# Patient Record
Sex: MALE | Race: WHITE | URBAN - METROPOLITAN AREA
[De-identification: names, ages, dates, MRNs, and addresses within clinical notes are randomized per-mention and may not be internally consistent; named-entity substitution may affect disease eponyms.]

---

## 2018-05-12 ENCOUNTER — OFFICE VISIT (OUTPATIENT)
Dept: INTERNAL MEDICINE CLINIC | Age: 63
End: 2018-05-12

## 2018-05-12 VITALS
RESPIRATION RATE: 18 BRPM | TEMPERATURE: 96.3 F | WEIGHT: 201 LBS | SYSTOLIC BLOOD PRESSURE: 135 MMHG | HEIGHT: 70 IN | DIASTOLIC BLOOD PRESSURE: 89 MMHG | OXYGEN SATURATION: 99 % | BODY MASS INDEX: 28.77 KG/M2 | HEART RATE: 59 BPM

## 2018-05-12 DIAGNOSIS — R68.83 CHILLS: ICD-10-CM

## 2018-05-12 DIAGNOSIS — Z76.89 ENCOUNTER FOR INCISION AND DRAINAGE PROCEDURE: ICD-10-CM

## 2018-05-12 DIAGNOSIS — L02.214 ABSCESS OF GROIN, LEFT: Primary | ICD-10-CM

## 2018-05-12 RX ORDER — SULFAMETHOXAZOLE AND TRIMETHOPRIM 800; 160 MG/1; MG/1
1 TABLET ORAL 2 TIMES DAILY
Qty: 14 TAB | Refills: 0 | Status: SHIPPED | OUTPATIENT
Start: 2018-05-12 | End: 2018-05-19

## 2018-05-12 RX ORDER — LIDOCAINE HYDROCHLORIDE 10 MG/ML
2 INJECTION, SOLUTION EPIDURAL; INFILTRATION; INTRACAUDAL; PERINEURAL ONCE
Qty: 1 VIAL | Refills: 0
Start: 2018-05-12 | End: 2018-05-12

## 2018-05-12 RX ORDER — EPINEPHRINE 1 MG/ML
1 INJECTION INTRAMUSCULAR; INTRAVENOUS; SUBCUTANEOUS ONCE
Qty: 1 VIAL | Refills: 0
Start: 2018-05-12 | End: 2018-05-12

## 2018-05-12 NOTE — MR AVS SNAPSHOT
[address] [facility], [place], operated by [facility] 
 
 
 [address] [facility] [address] [address] 
[phone] Patient: Kendra Jaramillo MRN: [id number]:[id number] Visit Information Date & Time Provider Department Dept. Phone Encounter #  
 5/12/2018  1:30 PM Ruthann Roth NP [address] [phone] [id number] Follow-up Instructions Return in about 3 days (around 5/15/2018), or if symptoms worsen or fail to improve, for F/up Abscess. Upcoming Health Maintenance Date Due DTaP/Tdap/Td series (1 - Tdap) 3/17/1977 FOBT Q 1 YEAR AGE 50-75 3/17/2006 ZOSTER VACCINE AGE 60> 1/17/2016 Influenza Age 5 to Adult 8/1/2018 Allergies as of 5/12/2018  Review Complete On: 5/12/2018 By: Ruthann Roth NP No Known Allergies Current Immunizations  Never Reviewed No immunizations on file. Not reviewed this visit You Were Diagnosed With   
  
 Codes Comments Abscess of groin, left    -  Primary ICD-10-CM: L02.214 ICD-9-CM: 682.2 Encounter for incision and drainage procedure     ICD-10-CM: Z01.89 ICD-9-CM: V72.85 Vitals BP Pulse Temp Resp Height(growth percentile) Weight(growth percentile) 135/89 (BP 1 Location: Right arm, BP Patient Position: Sitting) (!) 59 96.3 °F (35.7 °C) (Oral) 18 5' 10\" (1.778 m) 201 lb (91.2 kg) SpO2 BMI Smoking Status 99% 28.84 kg/m2 Never Smoker Vitals History BMI and BSA Data Body Mass Index Body Surface Area  
 28.84 kg/m 2 2.12 m 2 Preferred Pharmacy Pharmacy Name Phone [facility] [address] [facility], [address] [place] [address] [phone] Your Updated Medication List  
  
   
This list is accurate as of 5/12/18  2:45 PM.  Always use your most recent med list.  
  
  
  
  
 EPINEPHrine 1 mg/mL injection Commonly known as:  ADRENALIN  
1 mL by SubCUTAneous route once for 1 dose. lidocaine (PF) 10 mg/mL (1 %) injection Commonly known as:  XYLOCAINE  
2 mL by SubCUTAneous route once for 1 dose. Indications: ADMINISTRATION OF LOCAL ANESTHESIA  
  
 trimethoprim-sulfamethoxazole 160-800 mg per tablet Commonly known as:  BACTRIM DS, SEPTRA DS Take 1 Tab by mouth two (2) times a day for 7 days. Prescriptions Sent to Pharmacy Refills  
 trimethoprim-sulfamethoxazole (BACTRIM DS, SEPTRA DS) 160-800 mg per tablet 0 Sig: Take 1 Tab by mouth two (2) times a day for 7 days. Class: Normal  
 Pharmacy: [facility] [address] [place] [address], [address] [place] [address] Ph #: [phone] Route: Oral  
  
We Performed the Following DRAIN SKIN ABSCESS SIMPLE [29147 CPT(R)] VA THER/PROPH/DIAG INJECTION, SUBCUT/IM [id number] CPT(R)] Follow-up Instructions Return in about 3 days (around 5/15/2018), or if symptoms worsen or fail to improve, for F/up Abscess. To-Do List   
 05/12/2018 Microbiology:  CULTURE, WOUND W GRAM STAIN Patient Instructions Skin Abscess: Care Instructions Your Care Instructions A skin abscess is a bacterial infection that forms a pocket of pus. A boil is a kind of skin abscess. The doctor may have cut an opening in the abscess so that the pus can drain out. You may have gauze in the cut so that the abscess will stay open and keep draining. You may need antibiotics. You will need to follow up with your doctor to make sure the infection has gone away. The doctor has checked you carefully, but problems can develop later. If you notice any problems or new symptoms, get medical treatment right away. Follow-up care is a key part of your treatment and safety. Be sure to make and go to all appointments, and call your doctor if you are having problems. It's also a good idea to know your test results and keep a list of the medicines you take. How can you care for yourself at home? · Apply warm and dry compresses, a heating pad set on low, or a hot water bottle 3 or 4 times a day for pain. Keep a cloth between the heat source and your skin. · If your doctor prescribed antibiotics, take them as directed. Do not stop taking them just because you feel better. You need to take the full course of antibiotics. · Take pain medicines exactly as directed. ¨ If the doctor gave you a prescription medicine for pain, take it as prescribed. ¨ If you are not taking a prescription pain medicine, ask your doctor if you can take an over-the-counter medicine. · Wash with normal saline spray solution with dressing changes. Keep your bandage clean and dry. Change the bandage three times a day for the first three days, or whenever it gets wet or dirty. · If the abscess was packed with gauze: 
¨ Keep follow-up appointments to have the gauze changed or removed. If the doctor instructed you to remove the gauze, gently pull out all of the gauze when your doctor tells you to. ¨ After the gauze is removed, soak the area in warm water for 15 to 20 minutes 2 times a day, until the wound closes. When should you call for help? Call your doctor now or seek immediate medical care if: 
? · You have signs of worsening infection, such as: 
¨ Increased pain, swelling, warmth, or redness. ¨ Red streaks leading from the infected skin. ¨ Pus draining from the wound. ¨ A fever. ? Watch closely for changes in your health, and be sure to contact your doctor if: 
? · You do not get better as expected. Where can you learn more? Go to http://neo-dimas.info/. Enter G488 in the search box to learn more about \"Skin Abscess: Care Instructions. \" Current as of: October 13, 2016 Content Version: 11.4 © 9630-5468 Thing[employer].  Care instructions adapted under license by [employer] (which disclaims liability or warranty for this information). If you have questions about a medical condition or this instruction, always ask your healthcare professional. [address] any warranty or liability for your use of this information. Introducing [address]! [facility] introduces Skigit patient portal. Now you can access parts of your medical record, email your doctor's office, and request medication refills online. 1. In your internet browser, go to [email or web address] 2. Click on the First Time User? Click Here link in the Sign In box. You will see the New Member Sign Up page. 3. Enter your Skigit Access Code exactly as it appears below. You will not need to use this code after youve completed the sign-up process. If you do not sign up before the expiration date, you must request a new code. · Skigit Access Code: [email or web address] Expires: 8/10/2018  1:27 PM 
 
4. Enter the last four digits of your Social Security Number (xxxx) and Date of Birth (mm/dd/yyyy) as indicated and click Submit. You will be taken to the next sign-up page. 5. Create a Skigit ID. This will be your Skigit login ID and cannot be changed, so think of one that is secure and easy to remember. 6. Create a Skigit password. You can change your password at any time. 7. Enter your Password Reset Question and Answer. This can be used at a later time if you forget your password. 8. Enter your e-mail address. You will receive e-mail notification when new information is available in [address] 9. Click Sign Up. You can now view and download portions of your medical record. 10. Click the Download Summary menu link to download a portable copy of your medical information. If you have questions, please visit the Frequently Asked Questions section of the Skigit website. Remember, Skigit is NOT to be used for urgent needs. For medical emergencies, dial 911. Now available from your iPhone and Android! Please provide this summary of care documentation to your next provider. If you have any questions after today's visit, please call [phone].

## 2018-05-12 NOTE — PROGRESS NOTES
Pastor Bullock presents today for   Chief Complaint   Patient presents with    Skin Problem     poss abscess on abd. no confirmed fever       Pastor Bullock preferred language for health care discussion is english/other. Is someone accompanying this pt? Yes, wife    Is the patient using any DME equipment during [address]? no    Depression Screening:  PHQ over the last two weeks 5/12/2018   Little interest or pleasure in doing things Not at all   Feeling down, depressed or hopeless Not at all   Total Score PHQ 2 0       Learning Assessment:  Learning Assessment 5/12/2018   PRIMARY LEARNER Patient   HIGHEST LEVEL OF EDUCATION - PRIMARY LEARNER  > 4 YEARS OF COLLEGE   BARRIERS PRIMARY LEARNER NONE   CO-LEARNER CAREGIVER No   PRIMARY LANGUAGE ENGLISH   LEARNER PREFERENCE PRIMARY READING     LISTENING   ANSWERED BY patient   RELATIONSHIP SELF       Abuse Screening:  Abuse Screening Questionnaire 5/12/2018   Do you ever feel afraid of your partner? N   Are you in a relationship with someone who physically or mentally threatens you? N   Is it safe for you to go home? Y       Fall Risk  No flowsheet data found. Health Maintenance reviewed and discussed per provider. Yes    Pastor Bullock is due for   Health Maintenance Due   Topic Date Due    DTaP/Tdap/Td series (1 - Tdap) 03/17/1977    FOBT Q 1 YEAR AGE 50-75  03/17/2006    ZOSTER VACCINE AGE 60>  01/17/2016     Please order/place referral if appropriate. Advance Directive:  1. Do you have an advance directive in place? Patient Reply: no    2. If not, would you like material regarding how to put one in place? Patient Reply: no    Coordination of Care:  1. Have you been to the ER, urgent care clinic since your last visit? Hospitalized since your last visit? no    2. Have you seen or consulted any other health care providers outside of the [address] [place] [address] since your last visit? Include any pap smears or colon screening.  no

## 2018-05-12 NOTE — PROGRESS NOTES
HISTORY OF PRESENT ILLNESS  Mark Gill is a 59 y.o. male.   HPI Comments: Something smaller several months, Monday swelling, more pain, red, warm,   No drainage, starting to change at top, feeling warm    Hydrocortisone cream for itching and redness relief, triple antibiotic cream.    advil for pain     Skin Problem         ROS    Physical Exam    ASSESSMENT and PLAN  {ASSESSMENT/PLAN:92187}

## 2018-05-12 NOTE — PATIENT INSTRUCTIONS
Skin Abscess: Care Instructions  Your Care Instructions    A skin abscess is a bacterial infection that forms a pocket of pus. A boil is a kind of skin abscess. The doctor may have cut an opening in the abscess so that the pus can drain out. You may have gauze in the cut so that the abscess will stay open and keep draining. You may need antibiotics. You will need to follow up with your doctor to make sure the infection has gone away. The doctor has checked you carefully, but problems can develop later. If you notice any problems or new symptoms, get medical treatment right away. Follow-up care is a key part of your treatment and safety. Be sure to make and go to all appointments, and call your doctor if you are having problems. It's also a good idea to know your test results and keep a list of the medicines you take. How can you care for yourself at home? · Apply warm and dry compresses, a heating pad set on low, or a hot water bottle 3 or 4 times a day for pain. Keep a cloth between the heat source and your skin. · If your doctor prescribed antibiotics, take them as directed. Do not stop taking them just because you feel better. You need to take the full course of antibiotics. · Take pain medicines exactly as directed. ¨ If the doctor gave you a prescription medicine for pain, take it as prescribed. ¨ If you are not taking a prescription pain medicine, ask your doctor if you can take an over-the-counter medicine. · Wash with normal saline spray solution with dressing changes. Keep your bandage clean and dry. Change the bandage three times a day for the first three days, or whenever it gets wet or dirty. · If the abscess was packed with gauze:  ¨ Keep follow-up appointments to have the gauze changed or removed. If the doctor instructed you to remove the gauze, gently pull out all of the gauze when your doctor tells you to.   ¨ After the gauze is removed, soak the area in warm water for 15 to 20 minutes 2 times a day, until the wound closes. When should you call for help? Call your doctor now or seek immediate medical care if:  ? · You have signs of worsening infection, such as:  ¨ Increased pain, swelling, warmth, or redness. ¨ Red streaks leading from the infected skin. ¨ Pus draining from the wound. ¨ A fever. ? Watch closely for changes in your health, and be sure to contact your doctor if:  ? · You do not get better as expected. Where can you learn more? Go to http://neo-dimas.info/. Enter K989 in the search box to learn more about \"Skin Abscess: Care Instructions. \"  Current as of: October 13, 2016  Content Version: 11.4  © 6477-8639 "[employer]". Care instructions adapted under license by [employer] (which disclaims liability or warranty for this information). If you have questions about a medical condition or this instruction, always ask your healthcare professional. [place] [address] any warranty or liability for your use of this information.

## 2018-05-12 NOTE — PROGRESS NOTES
HISTORY OF PRESENT ILLNESS  Jacki Redmond is a 59 y.o. male. HPI Comments: Patient presents today with spouse at bedside, c/o abscess L groin, onset 3 months ago with small raised area, recent worsening starting Monday with increased redness, warmth, tenderness, central color change, and chills. Pt has been taking advil with pain relief and used hydrocortisone cream for itching and neosporin. Pt report h/o abscess R upper chest requiring I&D and stitches. Denies other pertinent medical history. [facility]  OFFICE PROCEDURE PROGRESS NOTE  Chart reviewed for the following:   Ranulfo BURNS NP, have reviewed the History, Physical and updated the Allergic reactions for [address] performed immediately prior to start of procedure:   Ranulfo BURNS NP, have performed the following reviews on Jacki Redmond prior to the start of the procedure:            * Patient was identified by name and date of birth   * Agreement on procedure being performed was verified  * Risks and Benefits explained to the patient  * Procedure site verified and marked as necessary  * Patient was positioned for comfort  * Consent was signed and verified     Time: 14:10      Date of procedure: 5/12/2018    Procedure performed by:  Ranulfo Sigala NP    Provider assisted by: Rosario Jeff LPN    Patient assisted by: spouse    How tolerated by patient: tolerated the procedure well, moderate pain, additional lidocaine injected with improved pain relief    Post Procedural Pain Scale: 2 - Hurts Little Bit    Comments: 3cm abscess, I&D, purulent drainage, culture obtained. Wound cleansed normal saline solution, dressed with gauze and tegaderm          Skin Problem   The history is provided by the patient. This is a chronic problem. The current episode started more than 1 week ago (several months). The problem has been rapidly worsening.  Pertinent negatives include no chest pain, no abdominal pain, no headaches and no shortness of breath. Treatments tried: hydrocortisone cream, neosporin  The treatment provided mild relief. Review of Systems   Constitutional: Positive for chills and diaphoresis. Negative for fever. Respiratory: Negative for shortness of breath. Cardiovascular: Negative for chest pain. Gastrointestinal: Negative for abdominal pain, nausea and vomiting. Musculoskeletal: Negative for myalgias. Skin: Positive for itching and rash. Abscess    Neurological: Negative for headaches. Physical Exam   Constitutional: He is oriented to person, place, and time. Vital signs are normal.   Cardiovascular: Normal rate and regular rhythm. Pulmonary/Chest: Breath sounds normal.   Neurological: He is alert and oriented to person, place, and time. Skin: There is erythema. Visit Vitals    /89 (BP 1 Location: Right arm, BP Patient Position: Sitting)    Pulse (!) 59    Temp 96.3 °F (35.7 °C) (Oral)    Resp 18    Ht 5' 10\" (1.778 m)    Wt 201 lb (91.2 kg)    SpO2 99%    BMI 28.84 kg/m2      ASSESSMENT and PLAN    ICD-10-CM ICD-9-CM    1. Abscess of groin, left L02.214 682.2 lidocaine, PF, (XYLOCAINE) 10 mg/mL (1 %) injection      EPINEPHrine (ADRENALIN) 1 mg/mL injection      SD THER/PROPH/DIAG INJECTION, SUBCUT/IM      CULTURE, WOUND W GRAM STAIN      DRAIN SKIN ABSCESS SIMPLE      trimethoprim-sulfamethoxazole (BACTRIM DS, SEPTRA DS) 160-800 mg per tablet      AMB SUPPLY ORDER   2. Encounter for incision and drainage procedure Z01.89 V72.85 lidocaine, PF, (XYLOCAINE) 10 mg/mL (1 %) injection      EPINEPHrine (ADRENALIN) 1 mg/mL injection      SD THER/PROPH/DIAG INJECTION, SUBCUT/IM      CULTURE, WOUND W GRAM STAIN      DRAIN SKIN ABSCESS SIMPLE      trimethoprim-sulfamethoxazole (BACTRIM DS, SEPTRA DS) 160-800 mg per tablet      AMB SUPPLY ORDER   3.  Chills R68.83 780.64 trimethoprim-sulfamethoxazole (BACTRIM DS, SEPTRA DS) 160-800 mg per tablet     Reviewed plan with patient and spouse including diagnoses, treatment and follow up. Provided instruction on antibiotic, wound care including dressing changes, cleansing, monitoring and f/u. Provided AVS with education on above. No further questions/concerns. Pt to follow up as scheduled 2-3 days or sooner if symptoms worsen/fail to improve. Will advise culture results accordingly.

## 2018-05-15 LAB
RESULT: ABNORMAL
RESULT: NORMAL